# Patient Record
Sex: MALE
[De-identification: names, ages, dates, MRNs, and addresses within clinical notes are randomized per-mention and may not be internally consistent; named-entity substitution may affect disease eponyms.]

---

## 2018-10-18 ENCOUNTER — HOSPITAL ENCOUNTER (EMERGENCY)
Dept: HOSPITAL 42 - ED | Age: 33
Discharge: HOME | End: 2018-10-18
Payer: COMMERCIAL

## 2018-10-18 VITALS
SYSTOLIC BLOOD PRESSURE: 139 MMHG | OXYGEN SATURATION: 96 % | DIASTOLIC BLOOD PRESSURE: 79 MMHG | HEART RATE: 89 BPM | TEMPERATURE: 98 F

## 2018-10-18 VITALS — BODY MASS INDEX: 28.2 KG/M2

## 2018-10-18 VITALS — RESPIRATION RATE: 18 BRPM
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
## 2018-10-18 DIAGNOSIS — S13.4XXA: Primary | ICD-10-CM
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
## 2018-10-18 NOTE — RAD
Date of service: 



10/18/2018



PROCEDURE:  Cervical Spine Radiographs.



HISTORY:

Pain. 



COMPARISON:

None. 



FINDINGS:



BONES:

Alignment maintained. No fracture.  Dens Intact. 



DISC SPACES:

Normal. 



SOFT TISSUES:

Normal. No prevertebral soft tissue swelling. 



OTHER FINDINGS:

None.



IMPRESSION:

Normal cervical spine radiographs